# Patient Record
Sex: FEMALE | Race: WHITE | HISPANIC OR LATINO | Employment: OTHER | URBAN - METROPOLITAN AREA
[De-identification: names, ages, dates, MRNs, and addresses within clinical notes are randomized per-mention and may not be internally consistent; named-entity substitution may affect disease eponyms.]

---

## 2024-06-06 ENCOUNTER — TELEPHONE (OUTPATIENT)
Dept: CARDIOLOGY | Facility: CLINIC | Age: 54
End: 2024-06-06

## 2024-06-06 DIAGNOSIS — R00.2 PALPITATIONS: Primary | ICD-10-CM

## 2024-06-06 NOTE — TELEPHONE ENCOUNTER
"Spoke with patient about recommendations. Per Dr. Cerna, \"If they are happening daily we can set her up for a 48 hour monitor while we're waiting for follow-up.\". Patient verbalizes understanding.  "

## 2024-06-06 NOTE — TELEPHONE ENCOUNTER
"Contacted patient about palpitations. Patient states they are like the palpitations she was experiencing at last visit on 9/19/2022. States palpitations come and go, 2-3 times a day. States palpitations last for a few minutes. Describes palpitations as \"sharp pain like a pinch\" that is in her chest and shoulder. Patient states she saw PCP about palpitations, who advised patient to contact cardiology office. Will request PCP note.  Patient had normal stress echo on 11/21/2022. Will get patient scheduled for f/u appointment. Please advise.   "

## 2024-06-06 NOTE — TELEPHONE ENCOUNTER
Caller: Alie Denis    Relationship to patient: Self    Best call back number: [phone]    Chief complaint: INTERMITTENT HEART PALPITATIONS    Type of visit: F/U APPT    Requested date: NEXT AVAILABLE     Additional notes:PLEASE CONTACT PATIENT WITH A SUITABLE DATE.

## 2024-07-05 ENCOUNTER — TELEPHONE (OUTPATIENT)
Dept: CARDIOLOGY | Facility: CLINIC | Age: 54
End: 2024-07-05

## 2024-07-05 NOTE — TELEPHONE ENCOUNTER
----- Message from Juvencio Cerna sent at 7/5/2024  1:49 PM EDT -----  Can you please call the patient to let them know about their normal monitor results.

## 2024-07-05 NOTE — TELEPHONE ENCOUNTER
Spoke with patient about normal holter monitor results per Dr. Cerna. Patient verbalizes understanding. Will continue f/u appointment.

## 2024-07-31 ENCOUNTER — OFFICE VISIT (OUTPATIENT)
Dept: CARDIOLOGY | Facility: CLINIC | Age: 54
End: 2024-07-31
Payer: COMMERCIAL

## 2024-07-31 VITALS
DIASTOLIC BLOOD PRESSURE: 78 MMHG | BODY MASS INDEX: 38.08 KG/M2 | SYSTOLIC BLOOD PRESSURE: 112 MMHG | HEART RATE: 71 BPM | OXYGEN SATURATION: 96 % | WEIGHT: 272 LBS | HEIGHT: 71 IN

## 2024-07-31 DIAGNOSIS — I45.10 INCOMPLETE RIGHT BUNDLE BRANCH BLOCK: Primary | ICD-10-CM

## 2024-07-31 DIAGNOSIS — R07.89 OTHER CHEST PAIN: ICD-10-CM

## 2024-07-31 PROCEDURE — 93000 ELECTROCARDIOGRAM COMPLETE: CPT | Performed by: INTERNAL MEDICINE

## 2024-07-31 PROCEDURE — 99213 OFFICE O/P EST LOW 20 MIN: CPT | Performed by: INTERNAL MEDICINE

## 2024-07-31 RX ORDER — BIOTIN 1 MG
1000 TABLET ORAL
COMMUNITY

## 2024-07-31 RX ORDER — TOPIRAMATE 50 MG/1
1 TABLET, FILM COATED ORAL EVERY 12 HOURS SCHEDULED
COMMUNITY
Start: 2024-07-15

## 2024-07-31 NOTE — PROGRESS NOTES
[facility] CARDIOLOGY    New Patient Office Visit    Patient Name: Alie Denis  : 1970   MRN: [id number]   Care Team: Patient Care Team:  Daniela Moreno MD as PCP - General (Family Medicine)  ErynJuvencio MD as Cardiologist (Cardiology)    Chief Complaint   Patient presents with    Chest pain, atypical     F/U     HPI: Alie Denis is a 54 y.o. female with who presents today for routine follow-up after being seen initially in 2022 after being referred for palpitations and episodes of chest pain.  She underwent stress echocardiography in 2022 which was negative for ischemia by ECG and echo criteria.  She had not been seen since but called the office in  reporting palpitations and sharp, pinching like pain and underwent 48-hour Holter monitor which did not have any patient events and had rare ectopy with 2 short atrial runs seen    Last week she had undergone treatment for symptomatic varicose veins with the vascular surgery clinic at .    Subjective   Review of Systems   Constitutional:  Positive for appetite change.   Respiratory:  Negative for shortness of breath.    Cardiovascular:  Positive for chest pain, palpitations and leg swelling.   Gastrointestinal: Negative.    Skin: Negative.      Past Medical History:   Diagnosis Date    Basal cell carcinoma     Insomnia      Past Surgical History:   Procedure Laterality Date    BASAL CELL CARCINOMA EXCISION      HEMORRHOIDECTOMY      VEIN SURGERY Right 2024     Social History     Socioeconomic History    Marital status: Single   Tobacco Use    Smoking status: Never     Passive exposure: Never    Smokeless tobacco: Never   Vaping Use    Vaping status: Never Used   Substance and Sexual Activity    Alcohol use: Not Currently     Alcohol/week: 1.0 standard drink of alcohol     Types: 1 Glasses of wine per week    Drug use: No    Sexual activity: Yes     Partners: Male     Birth control/protection:  "Post-menopausal     Family History   Problem Relation Age of Onset    Atrial fibrillation Mother     Heart disease Father     Hypertension Father     Atrial fibrillation Brother         Ablation in first half of 2024    No Known Problems Brother     No Known Problems Brother     Breast cancer Neg Hx     Ovarian cancer Neg Hx      Social History     Tobacco Use   Smoking Status Never    Passive exposure: Never   Smokeless Tobacco Never     Allergies   Allergen Reactions    Latex Rash     Added based on information entered during case entry, please review and add reactions, type, and severity as needed       Current Outpatient Medications:     Biotin 1000 MCG tablet, Take 1,000 mcg by mouth., Disp: , Rfl:     hydrOXYzine (ATARAX) 25 MG tablet, As Needed for Anxiety., Disp: , Rfl:     NON FORMULARY, USE AS DIRECTED - XLG LONG KH BEIGE W/ TOP 352TCTZ24/S, Disp: , Rfl:     Specialty Vitamins Products (COLLAGEN ULTRA PO), Take  by mouth., Disp: , Rfl:     SUMAtriptan (IMITREX) 25 MG tablet, Take 1 tablet by mouth Every 2 (Two) Hours As Needed for Migraine. Take one tablet at onset of headache. May repeat dose one time in 2 hours if headache not relieved., Disp: , Rfl:     topiramate (TOPAMAX) 50 MG tablet, Take 1 tablet by mouth Every 12 (Twelve) Hours., Disp: , Rfl:     Vitamin D, Cholecalciferol, (CHOLECALCIFEROL) 10 MCG (400 UNIT) tablet, Take 1 tablet by mouth Daily., Disp: , Rfl:     Objective     Vitals:    07/31/24 1149   BP: 112/78   BP Location: Left arm   Patient Position: Sitting   Cuff Size: Adult   Pulse: 71   SpO2: 96%   Weight: 123 kg (272 lb)   Height: 180.3 cm (71\")   Body mass index is 37.94 kg/m².  Physical Exam  Constitutional:       Appearance: Normal appearance.   HENT:      Head: Normocephalic.   Eyes:      General: No scleral icterus.     Conjunctiva/sclera: Conjunctivae normal.   Neck:      Vascular: No carotid bruit.   Cardiovascular:      Rate and Rhythm: Normal rate and regular rhythm.      " Pulses: Normal pulses.      Heart sounds: Normal heart sounds. No murmur heard.  Pulmonary:      Effort: Pulmonary effort is normal. No respiratory distress.      Breath sounds: Normal breath sounds. No wheezing or rales.   Abdominal:      General: Bowel sounds are normal. There is no distension.      Palpations: Abdomen is soft.      Tenderness: There is no abdominal tenderness.   Musculoskeletal:         General: No swelling. Normal range of motion.      Cervical back: Normal range of motion and neck supple.      Right lower leg: Swelling present. 1+ Edema present.      Left lower leg: Swelling present. 1+ Edema present.      Right ankle: Swelling present.      Left ankle: Swelling present.   Skin:     General: Skin is warm and dry.      Capillary Refill: Capillary refill takes less than 2 seconds.   Neurological:      General: No focal deficit present.      Mental Status: She is alert and oriented to person, place, and time. Mental status is at baseline.      Gait: Gait normal.   Psychiatric:         Mood and Affect: Mood normal.         Behavior: Behavior normal.         Thought Content: Thought content normal.       RESULTS:     6/24-6/27/24 - Wearable Monitor    A normal monitor study.    No patient events.  Rare ectopy.  2 short atrial runs seen with the longest of 4 beats.    11/22/22 - Stress Echo    Exercise stress testing performed according to Cyril protocol.  The patient exercised for 7 minutes and 14 seconds achieving a max work level of 8.8 METS.  She had appropriate heart rate and blood pressure response to exercise.    Baseline ECG had no ST or T wave changes and there was no significant changes with exercise.    Angel treadmill score of 7.2 indicative of low risk    Segment augmentation had a normal response to stress. Cavity size behaved normally in response to stress. Left ventricular function is increased hyperdynamic (>70%). Septal wall motion is normal.    Post stress images with adequate  visualization of myocardium to assess for ischemia. Normal stress echo with no significant echocardiographic evidence for myocardial ischemia.    Labs:    Lab Results   Component Value Date    WBC 5.64 05/04/2024    HGB 13.3 05/04/2024    HCT 41.7 05/04/2024    MCV 86 05/04/2024     05/04/2024        ECG 12 Lead    Date/Time: 7/31/2024 6:00 PM  Performed by: Juvencio Cerna MD    Authorized by: Juvencio Cerna MD  Comparison: compared with previous ECG from 9/19/2022  Similar to previous ECG  Rhythm: sinus rhythm  Rate: normal  BPM: 71  Conduction: incomplete right bundle branch block  ST Segments: ST segments normal  T Waves: T waves normal  Other: no other findings    Clinical impression: non-specific ECG        Assessment & Plan       ICD-10-CM ICD-9-CM   1. Incomplete right bundle branch block  I45.10 426.4   2. Other chest pain  R07.89 786.59       Atypical CP   - ECG reassuring today in clinic, iRBBB stable   - exercise stress echo negative    Leg swelling, venous insufficiency, varicose veins   - following with [facility]    Return in about 1 year (around 7/31/2025).    KEENAN Cerna MD, MS  07/31/24    [facility] Cardiology  [address] [address] [phone]

## 2024-12-17 ENCOUNTER — TRANSCRIBE ORDERS (OUTPATIENT)
Dept: ADMINISTRATIVE | Facility: HOSPITAL | Age: 54
End: 2024-12-17
Payer: COMMERCIAL

## 2024-12-17 DIAGNOSIS — Z12.31 SCREENING MAMMOGRAM FOR BREAST CANCER: Primary | ICD-10-CM

## 2025-01-01 LAB
NCCN CRITERIA FLAG: NORMAL
TYRER CUZICK SCORE: 9.3

## 2025-01-04 ENCOUNTER — PATIENT ROUNDING (BHMG ONLY) (OUTPATIENT)
Dept: URGENT CARE | Facility: CLINIC | Age: 55
End: 2025-01-04
Payer: COMMERCIAL

## 2025-04-09 ENCOUNTER — HOSPITAL ENCOUNTER (OUTPATIENT)
Dept: MAMMOGRAPHY | Facility: HOSPITAL | Age: 55
Discharge: HOME | End: 2025-04-09
Admitting: FAMILY MEDICINE
Payer: COMMERCIAL

## 2025-04-09 DIAGNOSIS — Z12.31 SCREENING MAMMOGRAM FOR BREAST CANCER: ICD-10-CM

## 2025-04-09 PROCEDURE — 77063 BREAST TOMOSYNTHESIS BI: CPT

## 2025-04-09 PROCEDURE — 77067 SCR MAMMO BI INCL CAD: CPT
